# Patient Record
Sex: MALE | Race: WHITE | HISPANIC OR LATINO | Employment: FULL TIME | URBAN - METROPOLITAN AREA
[De-identification: names, ages, dates, MRNs, and addresses within clinical notes are randomized per-mention and may not be internally consistent; named-entity substitution may affect disease eponyms.]

---

## 2018-06-07 ENCOUNTER — OFFICE VISIT (OUTPATIENT)
Dept: FAMILY MEDICINE | Facility: CLINIC | Age: 43
End: 2018-06-07
Payer: COMMERCIAL

## 2018-06-07 VITALS
WEIGHT: 174.69 LBS | TEMPERATURE: 99 F | OXYGEN SATURATION: 99 % | HEART RATE: 71 BPM | BODY MASS INDEX: 25.01 KG/M2 | HEIGHT: 70 IN | DIASTOLIC BLOOD PRESSURE: 90 MMHG | SYSTOLIC BLOOD PRESSURE: 122 MMHG

## 2018-06-07 DIAGNOSIS — Z13.220 SCREENING FOR CHOLESTEROL LEVEL: ICD-10-CM

## 2018-06-07 DIAGNOSIS — F17.200 TOBACCO DEPENDENCE: ICD-10-CM

## 2018-06-07 DIAGNOSIS — M25.511 CHRONIC RIGHT SHOULDER PAIN: Primary | ICD-10-CM

## 2018-06-07 DIAGNOSIS — R03.0 ELEVATED BLOOD-PRESSURE READING WITHOUT DIAGNOSIS OF HYPERTENSION: ICD-10-CM

## 2018-06-07 DIAGNOSIS — G89.29 CHRONIC RIGHT SHOULDER PAIN: Primary | ICD-10-CM

## 2018-06-07 PROCEDURE — 3008F BODY MASS INDEX DOCD: CPT | Mod: CPTII,S$GLB,, | Performed by: FAMILY MEDICINE

## 2018-06-07 PROCEDURE — 99999 PR PBB SHADOW E&M-EST. PATIENT-LVL III: CPT | Mod: PBBFAC,,, | Performed by: FAMILY MEDICINE

## 2018-06-07 PROCEDURE — 99204 OFFICE O/P NEW MOD 45 MIN: CPT | Mod: S$GLB,,, | Performed by: FAMILY MEDICINE

## 2018-06-07 RX ORDER — IBUPROFEN 100 MG/1
TABLET, CHEWABLE ORAL
COMMUNITY
End: 2018-09-07 | Stop reason: ALTCHOICE

## 2018-06-07 NOTE — PROGRESS NOTES
Office Visit    Patient Name: Noe Macdonald    : 1975  MRN: [id number]      Assessment/Plan:  Noe Macdonald is a 42 y.o. male who presents today for :    Chronic right shoulder pain  -     Ambulatory referral to Orthopedics  -activity modification d/w patient: avoid reaching overhead activities, avoid heavy lifting and provocative movements, alternate ice/heat/deep massage. Refer to Ortho given chronicity  -shoulder ROM stretching and strengthening exercises demonstrated in clinic and handouts given to patient  -RTC in 4-6 weeks if not better, or sooner if pain worsens.  -patient advised to use OTC Tylenol (325mg tablets) once every 4-6 hours as needed for pain - avoid regular use of NSAIDs due to potential GI/BP side effects and elevated BP.      Screening for cholesterol level  -     Lipid panel; Future; Expected date: 2018  Elevated blood-pressure reading without diagnosis of hypertension  -     Hemoglobin A1c; Future; Expected date: 2018  -     CBC Without Differential; Future; Expected date: 2018  -     Comprehensive metabolic panel; Future; Expected date: 2018  -no known prior Dx of HTN  -BP log advised and patient was counseled on DASH diet and regular exercise, avoiding smoking and keeping alcohol consumption in moderation  -RTC in 2 weeks for BP f/u if not consistently <140/90    Tobacco dependence  -Recd patient to quit tobacco usage, and advised on several options to quit and the benefits of quitting, such as: decreasing the risk of cancer, HTN, CVD, among other diseases.  -Patient is in pre-contemplative state of change      Follow-up for worsening Sx or as needed.     This note was created by combination of typed  and Dragon dictation.  Transcription errors may be present.  If there are any questions, please contact  me.        ----------------------------------------------------------------------------------------------------------------------      HPI:  Noe is a 42 y.o. male who presents today for:    Shoulder Pain      This patient is new to me     This patient has multiple medical diagnoses as noted below.      Patient is here today for L Shoulder Pain  for the past year - has not seen a physician for this due to lack of insurance until now. Pt states he takes Advil as needed for pain, usually once a day, which is sufficient to relieve the pain temporarily. Denies recent injury/trauma to area. No slips/falls.  He works as an  - pain is worse with raising L arm above horizontal above the shoulder. Resting makes the pain better During the day the pain is 2/10, but at night, it can get up to 6/10. He would like to get referral to Ortho/Sports medicine for further eval. No prior PT.  No wt loss/fatigue/malaise  No tingling/numbness/weakness.  Denies pain radiation  He has no prior h/o HTN, but his BP is elevated today, which could be due to his chronic NSAID use. He is a 1/2 ppd smoker.        Additional ROS    No CP/SOB/palpitations/swelling  No cough/wheezing/SOB  No nausea/vomiting/abd pain  No rash, no history of allergies to any specific substances    Patient Active Problem List   Diagnosis    Suicide attempt by substance overdose    Adjustment disorder with depressed mood    Alcohol abuse, continuous       Current Medications  Medications reviewed/updated.     Current Outpatient Prescriptions on File Prior to Visit   Medication Sig Dispense Refill    [DISCONTINUED] hydrocodone-acetaminophen 5-325mg (NORCO) 5-325 mg per tablet Take 1 tablet by mouth every 6 (six) hours as needed for Pain.       No current facility-administered medications on file prior to visit.        Past Surgical History:   Procedure Laterality Date    KNEE SURGERY         Family History   Problem Relation Age of Onset    Cancer Father  "    Hypertension Mother        Social History     Social History    Marital status: Single     Spouse name: N/A    Number of children: N/A    Years of education: N/A     Occupational History    Not on file.     Social History Main Topics    Smoking status: Current Some Day Smoker    Smokeless tobacco: Not on file    Alcohol use Yes    Drug use: No    Sexual activity: Not on file     Other Topics Concern    Not on file     Social History Narrative    No narrative on file           Allergies   Review of patient's allergies indicates:  No Known Allergies          Review of Systems  See HPI      Physical Exam  BP (!) 122/90   Pulse 71   Temp 98.6 °F (37 °C) (Oral)   Ht 5' 10" (1.778 m)   Wt 79.2 kg (174 lb 11.4 oz)   SpO2 99%   BMI 25.07 kg/m²     GEN: NAD, well developed, pleasant, well nourished  HEENT: NCAT, PERRLA, EOMI, sclera clear, anicteric, O/P clear, MMM with no lesions  NECK: normal, supple with midline trachea, no LAD, no thyromegaly  LUNGS: CTAB, no w/r/r, no increased work of breathing   HEART: RRR, normal S1 and S2, no m/r/g, no edema  ABD: s/nt/nd, NABS  SKIN: normal turgor, no rashes  PSYCH: AOx3, appropriate mood and affect  MSK: warm/well perfused, normal ROM in all 4 extremities, no c/c/e.  R shoulder with FROM except for abduction past horizontal. Normal  Flexion/extention/adduction/empty can/push off/internal and external rotation.   No swelling/erythema/TTP.                 "

## 2018-06-15 ENCOUNTER — TELEPHONE (OUTPATIENT)
Dept: FAMILY MEDICINE | Facility: CLINIC | Age: 43
End: 2018-06-15

## 2018-06-15 NOTE — LETTER
Ivonne 15, 2018    Noe Macdonald  [address] [place] [address]             [facility]  [address]  [place] [address]  Phone: [phone]  Fax: [phone] Dear Mr. Macdonald:    Donald we were unable to contact you to schedule your Orthopedic appointment. Please give the referral department a call at [phone].      If you have any questions or concerns, please don't hesitate to call.    Sincerely,        Chelsea Menodza MA

## 2018-06-27 ENCOUNTER — TELEPHONE (OUTPATIENT)
Dept: FAMILY MEDICINE | Facility: CLINIC | Age: 43
End: 2018-06-27

## 2018-06-27 NOTE — TELEPHONE ENCOUNTER
----- Message from Meredith Tavarez sent at 6/13/2018 10:52 AM CDT -----  Contact: Self  Pt calling to schedule ortho appt. [phone].

## 2018-09-04 ENCOUNTER — OFFICE VISIT (OUTPATIENT)
Dept: URGENT CARE | Facility: CLINIC | Age: 43
End: 2018-09-04
Payer: COMMERCIAL

## 2018-09-04 VITALS — DIASTOLIC BLOOD PRESSURE: 88 MMHG | SYSTOLIC BLOOD PRESSURE: 139 MMHG | HEART RATE: 60 BPM | TEMPERATURE: 98 F

## 2018-09-04 DIAGNOSIS — S39.012A ACUTE MYOFASCIAL STRAIN OF LUMBAR REGION, INITIAL ENCOUNTER: Primary | ICD-10-CM

## 2018-09-04 DIAGNOSIS — M54.50 ACUTE MIDLINE LOW BACK PAIN WITHOUT SCIATICA: ICD-10-CM
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
## 2018-09-04 PROCEDURE — 99203 OFFICE O/P NEW LOW 30 MIN: CPT | Mod: S$GLB,,, | Performed by: PHYSICIAN ASSISTANT

## 2018-09-04 NOTE — PROGRESS NOTES
Subjective:       Patient ID: Noe Macdonald is a 42 y.o. male.    Chief Complaint: Back Pain    New work injury (9/4/2018) Pt reports pushing a pallet with heavy electrical equipment on it when his body went numb, and he had immediate low back pain. Pt c/o intermittent sharp low back pain in the center of his back. Pt denies previous back injury, or radiating pain. He took Advil with no relief. Current pain 7/10 on pain scale. ajd        Review of Systems   Constitution: Negative for chills, fever and malaise/fatigue.   HENT: Negative for hearing loss and nosebleeds.    Eyes: Negative for blurred vision and redness.   Cardiovascular: Negative for chest pain and syncope.   Respiratory: Negative for cough and shortness of breath.    Skin: Negative for itching and rash.   Musculoskeletal: Positive for back pain. Negative for joint pain, muscle cramps, muscle weakness and stiffness.   Gastrointestinal: Negative for abdominal pain and bowel incontinence.   Genitourinary: Negative for bladder incontinence, dysuria, hematuria and urgency.   Neurological: Negative for disturbances in coordination, numbness and paresthesias.   Psychiatric/Behavioral: The patient is not nervous/anxious.    All other systems reviewed and are negative.      Objective:      Physical Exam   Constitutional: He appears well-developed and well-nourished. He is active. No distress.   HENT:   Head: Normocephalic and atraumatic.   Right Ear: Hearing and external ear normal.   Left Ear: Hearing and external ear normal.   Nose: Nose normal. No nasal deformity. No epistaxis.   Mouth/Throat: Oropharynx is clear and moist and mucous membranes are normal.   Eyes: Conjunctivae and lids are normal. Right conjunctiva is not injected. Left conjunctiva is not injected.   Neck: Trachea normal and normal range of motion. No spinous process tenderness and no muscular tenderness present.   Cardiovascular: Intact distal pulses and normal pulses.   Pulses:        Dorsalis pedis pulses are 2+ on the right side, and 2+ on the left side.        Posterior tibial pulses are 2+ on the right side, and 2+ on the left side.   Pulmonary/Chest: Effort normal. No stridor. No respiratory distress.   Abdominal: Normal appearance.   Musculoskeletal:        Cervical back: Normal.        Thoracic back: Normal.        Lumbar back: He exhibits decreased range of motion and tenderness. He exhibits no deformity and normal pulse.        Back:    Neurological: He is alert. He has normal strength and normal reflexes. No sensory deficit. GCS eye subscore is 4. GCS verbal subscore is 5. GCS motor subscore is 6.   Reflex Scores:       Patellar reflexes are 2+ on the right side and 2+ on the left side.       Achilles reflexes are 2+ on the right side and 2+ on the left side.  SLR negative bilaterally.    Skin: Skin is warm, dry and intact. No abrasion and no bruising noted.   Psychiatric: He has a normal mood and affect. His speech is normal and behavior is normal. Thought content normal. Cognition and memory are normal. He is attentive.   Nursing note and vitals reviewed.        X-ray Lumbar Spine 2 Or 3 Views    Result Date: 9/4/2018  EXAMINATION: XR LUMBAR SPINE 2 OR 3 VIEWS CLINICAL HISTORY: acute LBP;  Low back pain TECHNIQUE: Lumbar spine 2 of 2 or three views COMPARISON: None FINDINGS: Bony structures are intact.  Disc spaces are maintained.  No collapse or destruction.     No acute findings Electronically signed by: Balwinder Puente MD Date:    09/04/2018 Time:    13:05  Assessment:       1. Acute myofascial strain of lumbar region, initial encounter    2. Acute midline low back pain without sciatica    3. Work related injury        Plan:       OTC ibuprofen and tylenol as needed for pain.      Patient Instructions: Attention not to aggravate affected area   Restrictions: Regular Duty, Home today(Attention not to aggravate low back. )  Follow-up in about 1 week (around 9/11/2018).         Patient Instructions       Understanding Lumbosacral Strain    Lumbosacral strain is a medical term for an injury that causes low back pain. The lumbosacral area (low back) is between the bottom of the ribcage and the top of the buttocks. A strain is tearing of muscles and tendons. These tears can be very small but still cause pain.  How a lumbosacral strain happens  Muscles and tendons connected to the spine can be strained in a number of ways:  · Sitting or standing in the same position for long periods of time. This can harm the low back over time. Poor posture can make low back pain more likely.  · Moving the muscles and tendons past their usual range of motion. This can cause a sudden injury. This can happen when you twist, bend over, or lift something heavy. Not using correct technique for sports or tasks like lifting can make back injury more likely.  · Accidents or falls  Lumbosacral strain can be caused by other problems, but these are less common.  Symptoms of lumbosacral strain  Symptoms may include:  · Pain in the back, often on one side  · Pain that gets worse with movement and gets better with rest  · Inability to move as freely as usual  · Swelling, slight redness, and skin warmth in the painful area  Treatment for lumbosacral strain  Low back pain often goes away by itself within several weeks. But it often comes back. Treatment focuses on reducing pain and avoiding further injury. Bed rest is usually not recommended for low back pain. Treatments may include:  · Avoiding or changing the action that caused the problem. This helps prevent injuring the tissues again.  · Prescription or over-the-counter pain medicines. These help reduce inflammation, swelling, and pain.  · Cold or heat packs. These help reduce pain and swelling.  · Stretching and other exercises. These improve flexibility and strength.  · Physical therapy. This usually includes exercises and other treatments.  · Injections of  medicine. This may relieve symptoms.  If these treatments do not relieve symptoms, your healthcare provider may order imaging tests to learn more about the problem. Sometimes you may need surgery.  Possible complications of lumbosacral strain  If the cause of the pain is not addressed, symptoms may return or get worse. Follow your healthcare providers instructions on lifestyle changes and treating your back.     When to call your healthcare provider  Call your healthcare provider right away if you have any of these:  · Fever of 100.4°F (38°C) or higher, or as directed  · Numbness, tingling, or weakness  · Problems with bowel or bladder control, or problems having sex  · Pain that does not go away, or gets worse  · New symptoms   Date Last Reviewed: 3/10/2016  © 1875-6538 [employer]. [address] [place] [address]. All rights reserved. This information is not intended as a substitute for professional medical care. Always follow your healthcare professional's instructions.        Lumbar Extension (Flexibility)    1. Lie face down on your stomach, forehead on the floor. You can lie on a mat or towel.  2. Bend your arms next to your body and lift your upper body up on your forearms. Your palms and forearms should be flat on the floor. Keep your stomach and hips on the floor.  3. Hold your upper body up with your forearms for 20 seconds. Slowly lower back down to the floor.  4. Repeat 2 times, or as instructed.  Date Last Reviewed: 3/10/2016  © 7593-8903 [employer]. [address] [place] [address]. All rights reserved. This information is not intended as a substitute for professional medical care. Always follow your healthcare professional's instructions.        Lumbar Flexion (Flexibility)    5. Lie on your back on the floor, with your knees bent and your feet flat on the floor.  6. Gently pull your knees up toward your chest. Put your hands under your thighs to help  pull your knees up.  7. Press your lower back down to the floor. Hold for 20 seconds.  8. Lower your legs back down to the floor and relax.  9. Repeat 2 times, or as instructed.  Date Last Reviewed: 3/10/2016  © 1841-3131 [employer]. [address] [place] [address]. All rights reserved. This information is not intended as a substitute for professional medical care. Always follow your healthcare professional's instructions.        Lumbar Rotation    10. Lie on your back on the floor, with your knees bent and your feet flat on the floor. Dont press your neck or lower back to the floor.  11. Lean both of your knees to one side. Turn your head in the opposite direction. Keep your shoulders flat on the floor. Be gentle and dont push through pain.  12. Hold for 20 seconds. Then slowly move your knees and head in the other direction.  13. Repeat 2 to 5 times, or as instructed.   Date Last Reviewed: 3/10/2016  © 8544-1044 [employer]. [address] [place] [address]. All rights reserved. This information is not intended as a substitute for professional medical care. Always follow your healthcare professional's instructions.        Lumbar Stretch (Flexibility)    14. Lie on your back on the floor, with your knees bent and your feet flat on the floor. Dont press your neck or lower back to the floor.  15. Pull one knee up toward your chest. Clasp your hands under your thigh to help pull.  16. Hold for 30 to 60 seconds. Lower your leg back down to the floor.  17. Repeat 2 times, or as instructed.  18. Switch legs and repeat.  Date Last Reviewed: 3/10/2016  © 4084-5078 [employer]. [address] [place] [address]. All rights reserved. This information is not intended as a substitute for professional medical care. Always follow your healthcare professional's instructions.        Back Exercises: Back Press    Do this exercise on your hands and knees. Keep  your knees under your hips and your hands under your shoulders. Keep your spine in a neutral position (not arched or sagging). Be sure to maintain your necks natural curve:  · Tighten your stomach and buttock muscles to press your back upward. Let your head drop slightly.  · Hold for 5 seconds. Return to starting position.  · Repeat 5 times.  Date Last Reviewed: 10/11/2015  © 7188-4300 [employer]. [address] [place] [address], [place] [address]. All rights reserved. This information is not intended as a substitute for professional medical care. Always follow your healthcare professional's instructions.

## 2018-09-04 NOTE — LETTER
[facility]  [address] [place], [address]  [facility] [address]  Phone: [phone]  Fax: [phone]    Pt Name: Noe Macdonald  Injury Date: 09/04/2018   Employee ID: [id number] Date of First Treatment: 09/04/2018   Company: [employer]            Appointment Time: Arrived: 11:15 AM CDT   Physician: Balwinder Moseley PA-C Time out: 1:12 PM       Office Treatment: Noe was seen today for back pain.    Diagnoses and all orders for this visit:    Acute myofascial strain of lumbar region, initial encounter    Acute midline low back pain without sciatica  -     X-Ray Lumbar Spine 2 Or 3 Views    Work related injury       Patient Instructions: Attention not to aggravate affected area      Restrictions: Home today    Regular Duty  (Attention not to aggravate low back. )       Return Appointment: 9/11/2018 at 10:00 AM

## 2018-09-04 NOTE — PATIENT INSTRUCTIONS
Understanding Lumbosacral Strain    Lumbosacral strain is a medical term for an injury that causes low back pain. The lumbosacral area (low back) is between the bottom of the ribcage and the top of the buttocks. A strain is tearing of muscles and tendons. These tears can be very small but still cause pain.  How a lumbosacral strain happens  Muscles and tendons connected to the spine can be strained in a number of ways:  · Sitting or standing in the same position for long periods of time. This can harm the low back over time. Poor posture can make low back pain more likely.  · Moving the muscles and tendons past their usual range of motion. This can cause a sudden injury. This can happen when you twist, bend over, or lift something heavy. Not using correct technique for sports or tasks like lifting can make back injury more likely.  · Accidents or falls  Lumbosacral strain can be caused by other problems, but these are less common.  Symptoms of lumbosacral strain  Symptoms may include:  · Pain in the back, often on one side  · Pain that gets worse with movement and gets better with rest  · Inability to move as freely as usual  · Swelling, slight redness, and skin warmth in the painful area  Treatment for lumbosacral strain  Low back pain often goes away by itself within several weeks. But it often comes back. Treatment focuses on reducing pain and avoiding further injury. Bed rest is usually not recommended for low back pain. Treatments may include:  · Avoiding or changing the action that caused the problem. This helps prevent injuring the tissues again.  · Prescription or over-the-counter pain medicines. These help reduce inflammation, swelling, and pain.  · Cold or heat packs. These help reduce pain and swelling.  · Stretching and other exercises. These improve flexibility and strength.  · Physical therapy. This usually includes exercises and other treatments.  · Injections of medicine. This may relieve  symptoms.  If these treatments do not relieve symptoms, your healthcare provider may order imaging tests to learn more about the problem. Sometimes you may need surgery.  Possible complications of lumbosacral strain  If the cause of the pain is not addressed, symptoms may return or get worse. Follow your healthcare providers instructions on lifestyle changes and treating your back.     When to call your healthcare provider  Call your healthcare provider right away if you have any of these:  · Fever of 100.4°F (38°C) or higher, or as directed  · Numbness, tingling, or weakness  · Problems with bowel or bladder control, or problems having sex  · Pain that does not go away, or gets worse  · New symptoms   Date Last Reviewed: 3/10/2016  © 3601-9321 [employer]. [address] [place] [address]. All rights reserved. This information is not intended as a substitute for professional medical care. Always follow your healthcare professional's instructions.        Lumbar Extension (Flexibility)    1. Lie face down on your stomach, forehead on the floor. You can lie on a mat or towel.  2. Bend your arms next to your body and lift your upper body up on your forearms. Your palms and forearms should be flat on the floor. Keep your stomach and hips on the floor.  3. Hold your upper body up with your forearms for 20 seconds. Slowly lower back down to the floor.  4. Repeat 2 times, or as instructed.  Date Last Reviewed: 3/10/2016  © 6571-2188 [employer]. [address] [place] [address]. All rights reserved. This information is not intended as a substitute for professional medical care. Always follow your healthcare professional's instructions.        Lumbar Flexion (Flexibility)    5. Lie on your back on the floor, with your knees bent and your feet flat on the floor.  6. Gently pull your knees up toward your chest. Put your hands under your thighs to help pull your knees  up.  7. Press your lower back down to the floor. Hold for 20 seconds.  8. Lower your legs back down to the floor and relax.  9. Repeat 2 times, or as instructed.  Date Last Reviewed: 3/10/2016  © 0851-8967 [employer]. [address] [place] [address]. All rights reserved. This information is not intended as a substitute for professional medical care. Always follow your healthcare professional's instructions.        Lumbar Rotation    10. Lie on your back on the floor, with your knees bent and your feet flat on the floor. Dont press your neck or lower back to the floor.  11. Lean both of your knees to one side. Turn your head in the opposite direction. Keep your shoulders flat on the floor. Be gentle and dont push through pain.  12. Hold for 20 seconds. Then slowly move your knees and head in the other direction.  13. Repeat 2 to 5 times, or as instructed.   Date Last Reviewed: 3/10/2016  © 4613-0483 [employer]. [address] [place] [address]. All rights reserved. This information is not intended as a substitute for professional medical care. Always follow your healthcare professional's instructions.        Lumbar Stretch (Flexibility)    14. Lie on your back on the floor, with your knees bent and your feet flat on the floor. Dont press your neck or lower back to the floor.  15. Pull one knee up toward your chest. Clasp your hands under your thigh to help pull.  16. Hold for 30 to 60 seconds. Lower your leg back down to the floor.  17. Repeat 2 times, or as instructed.  18. Switch legs and repeat.  Date Last Reviewed: 3/10/2016  © 8694-6895 [employer]. [address] [place] [address]. All rights reserved. This information is not intended as a substitute for professional medical care. Always follow your healthcare professional's instructions.        Back Exercises: Back Press    Do this exercise on your hands and knees. Keep your knees under  your hips and your hands under your shoulders. Keep your spine in a neutral position (not arched or sagging). Be sure to maintain your necks natural curve:  · Tighten your stomach and buttock muscles to press your back upward. Let your head drop slightly.  · Hold for 5 seconds. Return to starting position.  · Repeat 5 times.  Date Last Reviewed: 10/11/2015  © 2133-4387 [employer]. [address] [place] [address], [place] [address]. All rights reserved. This information is not intended as a substitute for professional medical care. Always follow your healthcare professional's instructions.

## 2018-09-07 ENCOUNTER — OFFICE VISIT (OUTPATIENT)
Dept: OCCUPATIONAL MEDICINE | Facility: CLINIC | Age: 43
End: 2018-09-07
Payer: COMMERCIAL

## 2018-09-07 VITALS — SYSTOLIC BLOOD PRESSURE: 131 MMHG | DIASTOLIC BLOOD PRESSURE: 92 MMHG | HEART RATE: 64 BPM

## 2018-09-07 DIAGNOSIS — S39.012D ACUTE MYOFASCIAL STRAIN OF LUMBAR REGION, SUBSEQUENT ENCOUNTER: Primary | ICD-10-CM

## 2018-09-07 PROCEDURE — 99214 OFFICE O/P EST MOD 30 MIN: CPT | Mod: S$GLB,,, | Performed by: PHYSICIAN ASSISTANT

## 2018-09-07 RX ORDER — IBUPROFEN 400 MG/1
400 TABLET ORAL 3 TIMES DAILY
Qty: 30 TABLET | Refills: 0 | Status: SHIPPED | OUTPATIENT
Start: 2018-09-07 | End: 2020-12-22

## 2018-09-07 NOTE — LETTER
[facility] [address] [place] [address]  Phone: [phone]  Fax: [phone]    Pt Name: Noe Macdonald  Injury Date: 09/04/2018   Employee ID:  Date of First Treatment: 09/07/2018   Company: [employer]            Appointment Time: 02:00 PM Arrived:  2:15 PM CDT   Appointment Date: [unfilled] Time Out:03:15   Physician: Balwinder Moseley PA-C        Office Treatment: Noe was seen today for work related injury and back pain.    Diagnoses and all orders for this visit:    Acute myofascial strain of lumbar region, subsequent encounter  -     ibuprofen (ADVIL,MOTRIN) 400 MG tablet; Take 1 tablet (400 mg total) by mouth 3 (three) times daily. May take 1-2 tablets po TID. Take with food.       Patient Instructions: Attention not to aggravate affected area, Daily home exercises/warm soaks(May take tylenol 1000mg every 6 hours as needed. Benadryl 50mg at bedtime. )    Restrictions: (Duty not to aggravate back. )       Return Appointment: 9/11/2018

## 2018-09-07 NOTE — PROGRESS NOTES
Subjective:       Patient ID: Noe Macdonald is a 42 y.o. male.    Chief Complaint: Work Related Injury and Back Pain    Pt states that he is having lower back pain from his work related accident . Pain got real bad last night and requested to be seen today. He denies saddle paresthesias, bowel or bladder incontinence. He states it's hard to sleep and find a position of comfort. MB      Back Pain   This is a new problem. The current episode started in the past 7 days. The problem occurs constantly. The problem has been gradually worsening since onset. The pain is present in the lumbar spine. The quality of the pain is described as shooting. The pain is at a severity of 6/10. The pain is moderate. The symptoms are aggravated by bending, position, twisting, standing and sitting. Pertinent negatives include no abdominal pain, bladder incontinence, bowel incontinence, chest pain, fever, numbness or paresthesias. He has tried ice, heat and NSAIDs for the symptoms. The treatment provided mild relief.     Review of Systems   Constitution: Negative for chills and fever.   HENT: Negative for hearing loss and nosebleeds.    Eyes: Negative for blurred vision and redness.   Cardiovascular: Negative for chest pain and syncope.   Respiratory: Negative for cough and shortness of breath.    Skin: Negative for itching and rash.   Musculoskeletal: Positive for back pain. Negative for joint pain.   Gastrointestinal: Negative for abdominal pain and bowel incontinence.   Genitourinary: Negative for bladder incontinence.   Neurological: Negative for numbness and paresthesias.   Psychiatric/Behavioral: The patient is not nervous/anxious.        Objective:      Physical Exam   Constitutional: He appears well-developed and well-nourished. He is active. No distress.   HENT:   Head: Normocephalic and atraumatic.   Right Ear: Hearing and external ear normal.   Left Ear: Hearing and external ear normal.   Nose: Nose normal. No nasal  deformity. No epistaxis.   Mouth/Throat: Oropharynx is clear and moist and mucous membranes are normal.   Eyes: Conjunctivae and lids are normal. Right conjunctiva is not injected. Left conjunctiva is not injected.   Neck: Trachea normal and normal range of motion. No spinous process tenderness and no muscular tenderness present.   Cardiovascular: Intact distal pulses and normal pulses.   Pulses:       Dorsalis pedis pulses are 2+ on the right side, and 2+ on the left side.        Posterior tibial pulses are 2+ on the right side, and 2+ on the left side.   Pulmonary/Chest: Effort normal. No stridor. No respiratory distress.   Abdominal: Normal appearance.   Musculoskeletal:        Cervical back: Normal.        Thoracic back: Normal.        Lumbar back: He exhibits decreased range of motion and tenderness. He exhibits no deformity and normal pulse.        Back:    Neurological: He is alert. He has normal strength and normal reflexes. No sensory deficit. GCS eye subscore is 4. GCS verbal subscore is 5. GCS motor subscore is 6.   Reflex Scores:       Patellar reflexes are 2+ on the right side and 2+ on the left side.       Achilles reflexes are 2+ on the right side and 2+ on the left side.  SLR negative bilaterally.    Skin: Skin is warm, dry and intact. No abrasion and no bruising noted.   Psychiatric: He has a normal mood and affect. His speech is normal and behavior is normal. Thought content normal. Cognition and memory are normal. He is attentive.   Nursing note and vitals reviewed.      Assessment:       1. Acute myofascial strain of lumbar region, subsequent encounter        Plan:         Medications Ordered This Encounter   Medications    ibuprofen (ADVIL,MOTRIN) 400 MG tablet     Sig: Take 1 tablet (400 mg total) by mouth 3 (three) times daily. May take 1-2 tablets po TID. Take with food.     Dispense:  30 tablet     Refill:  0     Patient Instructions: Attention not to aggravate affected area, Daily home  exercises/warm soaks(May take tylenol 1000mg every 6 hours as needed. Benadryl 50mg at bedtime. )   Restrictions: (Duty not to aggravate back. )  Follow-up in about 4 days (around 9/11/2018).

## 2018-09-11 ENCOUNTER — OFFICE VISIT (OUTPATIENT)
Dept: URGENT CARE | Facility: CLINIC | Age: 43
End: 2018-09-11
Payer: COMMERCIAL

## 2018-09-11 DIAGNOSIS — S39.012D ACUTE MYOFASCIAL STRAIN OF LUMBAR REGION, SUBSEQUENT ENCOUNTER: Primary | ICD-10-CM
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
## 2018-09-11 PROCEDURE — 99214 OFFICE O/P EST MOD 30 MIN: CPT | Mod: S$GLB,,, | Performed by: PHYSICIAN ASSISTANT

## 2018-09-11 NOTE — PROGRESS NOTES
"Subjective:       Patient ID: Noe Macdonald is a 42 y.o. male.    Chief Complaint: Back Injury    F/u for low back injury (9/4/2018) Pt states his back is "still a little sore" "doing better than Friday" He states his back injury is more of a discomfort than pain. Prolonged sitting and standing affects his injury. He denies any numbness in his lower extremities. Pt has not taken meds in the past two days for pain. Last dose of ibuprofen 800 was Sunday which provided mild relief.       Review of Systems   Constitution: Negative for chills, fever and malaise/fatigue.   HENT: Negative for hearing loss and nosebleeds.    Eyes: Negative for blurred vision and redness.   Cardiovascular: Negative for chest pain and syncope.   Respiratory: Negative for cough and shortness of breath.    Skin: Negative for itching and rash.   Musculoskeletal: Positive for back pain and myalgias. Negative for joint pain, muscle cramps, muscle weakness and stiffness.   Gastrointestinal: Negative for abdominal pain and bowel incontinence.   Genitourinary: Negative for bladder incontinence, dysuria, hematuria and urgency.   Neurological: Negative for disturbances in coordination, numbness and paresthesias.   Psychiatric/Behavioral: The patient is not nervous/anxious.    All other systems reviewed and are negative.      Objective:      Physical Exam   Constitutional: He appears well-developed and well-nourished. He is active. No distress.   HENT:   Head: Normocephalic and atraumatic.   Right Ear: Hearing and external ear normal.   Left Ear: Hearing and external ear normal.   Nose: Nose normal. No nasal deformity. No epistaxis.   Mouth/Throat: Oropharynx is clear and moist and mucous membranes are normal.   Eyes: Conjunctivae and lids are normal. Right conjunctiva is not injected. Left conjunctiva is not injected.   Neck: Trachea normal and normal range of motion. No spinous process tenderness and no muscular tenderness present. "   Cardiovascular: Intact distal pulses and normal pulses.   Pulses:       Dorsalis pedis pulses are 2+ on the right side, and 2+ on the left side.        Posterior tibial pulses are 2+ on the right side, and 2+ on the left side.   Pulmonary/Chest: Effort normal. No stridor. No respiratory distress.   Abdominal: Normal appearance.   Musculoskeletal:        Cervical back: Normal.        Thoracic back: Normal.        Lumbar back: He exhibits tenderness. He exhibits normal range of motion, no deformity and normal pulse.        Back:    Neurological: He is alert. He has normal strength and normal reflexes. No sensory deficit. GCS eye subscore is 4. GCS verbal subscore is 5. GCS motor subscore is 6.   Reflex Scores:       Patellar reflexes are 2+ on the right side and 2+ on the left side.       Achilles reflexes are 2+ on the right side and 2+ on the left side.  SLR negative bilaterally.    Skin: Skin is warm, dry and intact. No abrasion and no bruising noted.   Psychiatric: He has a normal mood and affect. His speech is normal and behavior is normal. Thought content normal. Cognition and memory are normal. He is attentive.   Nursing note and vitals reviewed.      Assessment:       1. Acute myofascial strain of lumbar region, subsequent encounter    2. Work related injury        Plan:            Patient Instructions: Daily home exercises/warm soaks(Ibuprofen as needed for pain. )   Restrictions: Regular Duty(Duty not to aggravate back. )  No Follow-up on file.

## 2018-09-11 NOTE — LETTER
[facility]  [address], [address]  [facility] [address]  Phone: [phone]  Fax: [phone]    Pt Name: Noe Macdonald  Injury Date: 09/04/2018   Employee ID: [id number] Date 09/11/2018   Company: [employer]            Appointment Time: 10:00 AM Arrived: 10:00 AM CDT   Physician: Balwinder Moseley PA-C Time out: 11:00 AM       Office Treatment: Noe was seen today for back injury.    Diagnoses and all orders for this visit:    Acute myofascial strain of lumbar region, subsequent encounter    Work related injury       Patient Instructions: Daily home exercises/warm soaks(Ibuprofen as needed for pain. )      Restrictions: Regular Duty   (Duty not to aggravate back.)       Return Appointment: 9/18/2018 @ 10:30 AM

## 2019-08-27 ENCOUNTER — OFFICE VISIT (OUTPATIENT)
Dept: URGENT CARE | Facility: CLINIC | Age: 44
End: 2019-08-27
Payer: COMMERCIAL

## 2019-08-27 VITALS
BODY MASS INDEX: 24.36 KG/M2 | SYSTOLIC BLOOD PRESSURE: 132 MMHG | OXYGEN SATURATION: 99 % | HEIGHT: 71 IN | DIASTOLIC BLOOD PRESSURE: 88 MMHG | WEIGHT: 174 LBS | HEART RATE: 70 BPM | TEMPERATURE: 99 F

## 2019-08-27 DIAGNOSIS — L02.415 ABSCESS OF RIGHT THIGH: Primary | ICD-10-CM

## 2019-08-27 DIAGNOSIS — B35.6 TINEA CRURIS: ICD-10-CM

## 2019-08-27 PROCEDURE — 99214 OFFICE O/P EST MOD 30 MIN: CPT | Mod: S$GLB,,, | Performed by: PHYSICIAN ASSISTANT

## 2019-08-27 PROCEDURE — 3008F PR BODY MASS INDEX (BMI) DOCUMENTED: ICD-10-PCS | Mod: CPTII,S$GLB,, | Performed by: PHYSICIAN ASSISTANT

## 2019-08-27 PROCEDURE — 3008F BODY MASS INDEX DOCD: CPT | Mod: CPTII,S$GLB,, | Performed by: PHYSICIAN ASSISTANT

## 2019-08-27 PROCEDURE — 99214 PR OFFICE/OUTPT VISIT, EST, LEVL IV, 30-39 MIN: ICD-10-PCS | Mod: S$GLB,,, | Performed by: PHYSICIAN ASSISTANT

## 2019-08-27 RX ORDER — KETOCONAZOLE 20 MG/G
CREAM TOPICAL DAILY
Qty: 15 G | Refills: 0 | Status: SHIPPED | OUTPATIENT
Start: 2019-08-27 | End: 2020-12-22

## 2019-08-27 RX ORDER — SULFAMETHOXAZOLE AND TRIMETHOPRIM 800; 160 MG/1; MG/1
1 TABLET ORAL 2 TIMES DAILY
Qty: 10 TABLET | Refills: 0 | Status: SHIPPED | OUTPATIENT
Start: 2019-08-27 | End: 2019-09-01

## 2019-08-27 RX ORDER — MUPIROCIN 20 MG/G
OINTMENT TOPICAL
Qty: 22 G | Refills: 1 | Status: SHIPPED | OUTPATIENT
Start: 2019-08-27 | End: 2020-12-22

## 2019-08-27 NOTE — PROGRESS NOTES
"Subjective:       Patient ID: Noe Macdonald is a 43 y.o. male.    Vitals:  height is 5' 11" (1.803 m) and weight is 78.9 kg (174 lb). His temperature is 99.4 °F (37.4 °C). His blood pressure is 132/88 and his pulse is 70. His oxygen saturation is 99%.     Chief Complaint: Rash and Abscess    Pt reports that he has been having a rash on his groin for four weeks that is red and itchy. He has tried otc ointments with no relief. He also states having a possible abcesss on his right upper thigh that he noticed yesterday. Area is very tender and red. Denies drainage, fever, n/v.     Rash   This is a chronic problem. The current episode started 1 to 4 weeks ago. The problem has been gradually worsening since onset. The affected locations include the right upper leg and left upper leg. The rash is characterized by itchiness, redness and swelling. It is unknown if there was an exposure to a precipitant. Pertinent negatives include no congestion, cough, facial edema, fatigue, fever, rhinorrhea or sore throat. Past treatments include anti-itch cream. The treatment provided no relief.   Abscess   Chronicity:  NewProgression Since Onset: rapidly worsening  Location:  Leg  Associated Symptoms: no fever, no chills  Characteristics: painful    Treatments Tried:  Nothing  Relieved by:  Nothing  Worsened by:  Nothing      Constitution: Negative for chills, fatigue and fever.   HENT: Negative for facial swelling, congestion and sore throat.    Neck: Negative for painful lymph nodes.   Eyes: Negative for eye itching and eyelid swelling.   Respiratory: Negative for cough.    Musculoskeletal: Negative for joint pain and joint swelling.   Skin: Positive for rash and abscess. Negative for color change, pale, wound, abrasion, laceration, lesion, skin thickening/induration, puncture wound, erythema, avulsion and hives.   Allergic/Immunologic: Negative for environmental allergies, immunocompromised state and hives. "   Hematologic/Lymphatic: Negative for swollen lymph nodes.       Objective:      Physical Exam   Constitutional: He is oriented to person, place, and time. He appears well-developed and well-nourished.   HENT:   Head: Normocephalic and atraumatic. Head is without abrasion, without contusion and without laceration.   Right Ear: External ear normal.   Left Ear: External ear normal.   Nose: Nose normal.   Mouth/Throat: Oropharynx is clear and moist.   Eyes: Pupils are equal, round, and reactive to light. Conjunctivae, EOM and lids are normal.   Neck: Trachea normal, full passive range of motion without pain and phonation normal. Neck supple.   Cardiovascular: Normal rate, regular rhythm and normal heart sounds.   Pulmonary/Chest: Effort normal and breath sounds normal. No stridor. No respiratory distress.   Musculoskeletal: Normal range of motion.   Neurological: He is alert and oriented to person, place, and time.   Skin: Skin is warm, dry and intact. Capillary refill takes less than 2 seconds. Rash noted. No abrasion, no bruising, no burn, no ecchymosis, no laceration and no lesion noted. No erythema.        Dry, scaly, erythematous rash to bilateral groin.    Psychiatric: He has a normal mood and affect. His speech is normal and behavior is normal. Judgment and thought content normal. Cognition and memory are normal.   Nursing note and vitals reviewed.      Assessment:       1. Abscess of right thigh    2. Tinea cruris        Plan:         Abscess of right thigh  -     sulfamethoxazole-trimethoprim 800-160mg (BACTRIM DS) 800-160 mg Tab; Take 1 tablet by mouth 2 (two) times daily. for 5 days  Dispense: 10 tablet; Refill: 0  -     mupirocin (BACTROBAN) 2 % ointment; Apply to affected area 3 times daily  Dispense: 22 g; Refill: 1    Tinea cruris  -     ketoconazole (NIZORAL) 2 % cream; Apply topically once daily.  Dispense: 15 g; Refill: 0      Abscess (Antibiotic Treatment Only)  An abscess (sometimes called a boil)  happens when bacteria get trapped under the skin and start to grow. Pus forms inside the abscess as the body responds to the bacteria. An abscess can happen with an insect bite, ingrown hair, blocked oil gland, pimple, cyst, or puncture wound.  In the early stages, your wound may be red and tender. For this stage, you may get antibiotics. If the abscess does not get better with antibiotics, it will need to be drained with a small cut.  Home care  These tips will help you care for your abscess at home:  · Soak the wound in hot water or apply hot packs (small towel soaked in hot water) to the area for 20 minutes at a time. Do this 3 to 4 times a day.  · Do not cut, squeeze, or pop the boil yourself.  · Apply antibiotic cream or ointment to the skin 3 to 4 times a day, unless something else was prescribed. Some ointments include an antibiotic plus a pain reliever.  · If your doctor prescribed antibiotics, do not stop taking them until you have finished the medicine or the doctor tells you to stop.  · You may use an over-the-counter pain medicine to control pain, unless another pain medicine was prescribed. If you have chronic liver or kidney disease or ever had a stomach ulcer or gastrointestinal bleeding, talk with your doctor before using these any of these.  Follow-up care  Follow up with your healthcare provider, or as advised. Check your wound each day for the signs of worsening infection listed below.  When to seek medical advice  Get prompt medical attention if any of these occur:  · An increase in redness or swelling  · Red streaks in the skin leading away from the abscess  · An increase in local pain or swelling  · Fever of 100.4ºF (38ºC) or higher, or as directed by your healthcare provider  · Pus or fluid coming from the abscess  · Boil returns after getting better  Date Last Reviewed: 9/1/2016  © 8052-9280 [employer]. [address] [place] [address], [place] [address]. All rights reserved. This  information is not intended as a substitute for professional medical care. Always follow your healthcare professional's instructions.        Jock Itch  Jock itch is a rash caused by a fungal infection. It occurs in the skin fold between the thigh and groin where it is warm and moist.  It starts as a small, red, itchy patch that grows larger in the shape of a round ring, 1- to 2- inches wide, and may cause the skin to flake. It may also spread to the scrotum or the skin that covers your testicles. This infection is treated with skin creams or oral medicine.  Home care  The following will help you care for yourself at home:  · If you were prescribed a cream, it should be applied exactly as directed. Some antifungal creams are available without a prescription.  · It may take a week before the fungus starts to go away and it can take about 2 to 3 weeks to completely clear. To prevent recurrence, it is important to keep using the medicine until the rash is all gone.  · Wash the groin area at least once a day with soap and water. Pat dry and apply the medicine. Change to freshly laundered underwear daily.  · Once the rash is gone, keep the area clean and dry to keep it from coming back. If recurrence is a problem, use a medicated antifungal powder daily in the groin area.  Prevention  The following tips may help prevent jock itch:  · Don't share clothes, towels, or sports gear with others unless they have been washed.  · Change underwear daily.  · Keep skin clean and dry, especially after showering or swimming.  · Lose weight.  · Do not wear tight underwear.  · Treat athletes foot if it occurs.  Follow-up care  Follow up with your healthcare provider as advised by our staff if the rash is not starting to get better after 10 days of treatment or if the rash continues to spread.  When to seek medical care  Get prompt medical attention if any of the following occur:  · Fluid draining from the rash  · Increasing redness  around the rash  · Rash returns soon after treatment  Date Last Reviewed: 8/1/2016  © 6976-9763 [employer]. [address] [place] [address], [place] [address]. All rights reserved. This information is not intended as a substitute for professional medical care. Always follow your healthcare professional's instructions.

## 2019-08-27 NOTE — PATIENT INSTRUCTIONS
Abscess (Antibiotic Treatment Only)  An abscess (sometimes called a boil) happens when bacteria get trapped under the skin and start to grow. Pus forms inside the abscess as the body responds to the bacteria. An abscess can happen with an insect bite, ingrown hair, blocked oil gland, pimple, cyst, or puncture wound.  In the early stages, your wound may be red and tender. For this stage, you may get antibiotics. If the abscess does not get better with antibiotics, it will need to be drained with a small cut.  Home care  These tips will help you care for your abscess at home:  · Soak the wound in hot water or apply hot packs (small towel soaked in hot water) to the area for 20 minutes at a time. Do this 3 to 4 times a day.  · Do not cut, squeeze, or pop the boil yourself.  · Apply antibiotic cream or ointment to the skin 3 to 4 times a day, unless something else was prescribed. Some ointments include an antibiotic plus a pain reliever.  · If your doctor prescribed antibiotics, do not stop taking them until you have finished the medicine or the doctor tells you to stop.  · You may use an over-the-counter pain medicine to control pain, unless another pain medicine was prescribed. If you have chronic liver or kidney disease or ever had a stomach ulcer or gastrointestinal bleeding, talk with your doctor before using these any of these.  Follow-up care  Follow up with your healthcare provider, or as advised. Check your wound each day for the signs of worsening infection listed below.  When to seek medical advice  Get prompt medical attention if any of these occur:  · An increase in redness or swelling  · Red streaks in the skin leading away from the abscess  · An increase in local pain or swelling  · Fever of 100.4ºF (38ºC) or higher, or as directed by your healthcare provider  · Pus or fluid coming from the abscess  · Boil returns after getting better  Date Last Reviewed: 9/1/2016  © 2733-7743 [employer] [address] [place] [address]. All rights reserved. This information is not intended as a substitute for professional medical care. Always follow your healthcare professional's instructions.        Jock Itch  Jock itch is a rash caused by a fungal infection. It occurs in the skin fold between the thigh and groin where it is warm and moist.  It starts as a small, red, itchy patch that grows larger in the shape of a round ring, 1- to 2- inches wide, and may cause the skin to flake. It may also spread to the scrotum or the skin that covers your testicles. This infection is treated with skin creams or oral medicine.  Home care  The following will help you care for yourself at home:  · If you were prescribed a cream, it should be applied exactly as directed. Some antifungal creams are available without a prescription.  · It may take a week before the fungus starts to go away and it can take about 2 to 3 weeks to completely clear. To prevent recurrence, it is important to keep using the medicine until the rash is all gone.  · Wash the groin area at least once a day with soap and water. Pat dry and apply the medicine. Change to freshly laundered underwear daily.  · Once the rash is gone, keep the area clean and dry to keep it from coming back. If recurrence is a problem, use a medicated antifungal powder daily in the groin area.  Prevention  The following tips may help prevent jock itch:  · Don't share clothes, towels, or sports gear with others unless they have been washed.  · Change underwear daily.  · Keep skin clean and dry, especially after showering or swimming.  · Lose weight.  · Do not wear tight underwear.  · Treat athletes foot if it occurs.  Follow-up care  Follow up with your healthcare provider as advised by our staff if the rash is not starting to get better after 10 days of treatment or if the rash continues to spread.  When to seek medical care  Get prompt medical attention if any of the  following occur:  · Fluid draining from the rash  · Increasing redness around the rash  · Rash returns soon after treatment  Date Last Reviewed: 8/1/2016  © 2671-5754 [employer]. [address] [place] [address], [place] [address]. All rights reserved. This information is not intended as a substitute for professional medical care. Always follow your healthcare professional's instructions.

## 2020-12-22 ENCOUNTER — OFFICE VISIT (OUTPATIENT)
Dept: URGENT CARE | Facility: CLINIC | Age: 45
End: 2020-12-22
Payer: COMMERCIAL

## 2020-12-22 VITALS
OXYGEN SATURATION: 97 % | SYSTOLIC BLOOD PRESSURE: 152 MMHG | HEART RATE: 63 BPM | TEMPERATURE: 99 F | DIASTOLIC BLOOD PRESSURE: 93 MMHG

## 2020-12-22 DIAGNOSIS — R09.1 PLEURISY: ICD-10-CM

## 2020-12-22 DIAGNOSIS — M54.9 UPPER BACK PAIN ON LEFT SIDE: Primary | ICD-10-CM

## 2020-12-22 PROCEDURE — 99214 OFFICE O/P EST MOD 30 MIN: CPT | Mod: S$GLB,,, | Performed by: PHYSICIAN ASSISTANT

## 2020-12-22 PROCEDURE — 99214 PR OFFICE/OUTPT VISIT, EST, LEVL IV, 30-39 MIN: ICD-10-PCS | Mod: S$GLB,,, | Performed by: PHYSICIAN ASSISTANT

## 2020-12-22 PROCEDURE — 71046 XR CHEST PA AND LATERAL: ICD-10-PCS | Mod: S$GLB,,, | Performed by: RADIOLOGY

## 2020-12-22 PROCEDURE — 71046 X-RAY EXAM CHEST 2 VIEWS: CPT | Mod: S$GLB,,, | Performed by: RADIOLOGY

## 2020-12-22 RX ORDER — IBUPROFEN 800 MG/1
800 TABLET ORAL 3 TIMES DAILY
Qty: 30 TABLET | Refills: 0 | Status: SHIPPED | OUTPATIENT
Start: 2020-12-22 | End: 2021-01-01

## 2020-12-22 RX ORDER — METHOCARBAMOL 500 MG/1
500 TABLET, FILM COATED ORAL 4 TIMES DAILY
Qty: 40 TABLET | Refills: 0 | Status: SHIPPED | OUTPATIENT
Start: 2020-12-22 | End: 2021-01-01

## 2020-12-22 NOTE — PATIENT INSTRUCTIONS
General Discharge Instructions   If you were prescribed a narcotic or controlled medication, do not drive or operate heavy equipment or machinery while taking these medications.  If you were prescribed antibiotics, please take them to completion.  You must understand that you've received an Urgent Care treatment only and that you may be released before all your medical problems are known or treated. You, the patient, will arrange for follow up care as instructed.  Follow up with your PCP or specialty clinic as directed in the next 1-2 weeks if not improved or as needed.  You can call [phone] to schedule an appointment with the appropriate provider.  If your condition worsens we recommend that you receive another evaluation at the emergency room immediately or contact your primary medical clinics after hours call service to discuss your concerns.  Please return here or go to the Emergency Department for any concerns or worsening of condition.      Pleurisy    If you have pleurisy, the lining around your lungs is inflamed. This is most often due to a viral infection or pneumonia. It usually lasts for 10 to 14 days. It may cause sharp pain with breathing, coughing, sneezing, and movement. Antibiotics are usually not prescribed for this condition unless pneumonia is also present.  The following tips will help you care for your condition at home:  · If symptoms are severe, rest at home for the first 2 to 3 days. When you resume activity, don't let yourself get too tired.  · Do not smoke. Also avoid being exposed to secondhand smoke.  · You may use over-the-counter medicines to control pain, unless another pain medicine was prescribed. (Note: If you have chronic liver or kidney disease or have ever had a stomach ulcer or gastrointestinal bleeding, talk with your healthcare provider before using these medicines. Also talk to your provider if you are taking medicine to prevent blood clots.) Aspirin should never be  given to anyone younger than 18 years of age who is ill with a viral infection or fever. It may cause severe liver or brain damage.  Follow-up care  Follow up with your healthcare provider, or as advised.  When to seek medical advice  Call your healthcare provider right away if any of these occur:  · Fever over 100.4ºF (38ºC)  · Coughing up lots of colored sputum (mucus)  · Redness, pain, or swelling of the leg  Call 911, or get immediate medical care  Contact emergency services right away if any of these occur:  · Increasing shortness of breath  · Increasing chest pain, or pain that spreads to the neck, arm, or back  · Coughing up blood  Date Last Reviewed: 9/13/2015 © 2000-2017 [employer]. [address] [place] [address], [place] [address]. All rights reserved. This information is not intended as a substitute for professional medical care. Always follow your healthcare professional's instructions.      Back Pain (Acute or Chronic)    Back pain is one of the most common problems. The good news is that most people feel better in 1 to 2 weeks, and most of the rest in 1 to 2 months. Most people can remain active.  People experience and describe pain differently; not everyone is the same.  · The pain can be sharp, stabbing, shooting, aching, cramping or burning.  · Movement, standing, bending, lifting, sitting, or walking may worsen pain.  · It can be localized to one spot or area, or it can be more generalized.  · It can spread or radiate upwards, to the front, or go down your arms or legs (sciatica).  · It can cause muscle spasm.  Most of the time, mechanical problems with the muscles or spine cause the pain. Mechanical problems are usually caused by an injury to the muscles or ligaments. While illness can cause back pain, it is usually not caused by a serious illness. Mechanical problems include:   · Physical activity such as sports, exercise, work, or normal activity  · Overexertion, lifting, pushing,  pulling incorrectly or too aggressively  · Sudden twisting, bending, or stretching from an accident, or accidental movement  · Poor posture  · Stretching or moving wrong, without noticing pain at the time  · Poor coordination, lack of regular exercise (check with your doctor about this)  · Spinal disc disease or arthritis  · Stress  Pain can also be related to pregnancy, or illness like appendicitis, bladder or kidney infections, pelvic infections, and many other things.  Acute back pain usually gets better in 1 to 2 weeks. Back pain related to disk disease, arthritis in the spinal joints or spinal stenosis (narrowing of the spinal canal) can become chronic and last for months or years.  Unless you had a physical injury (for example, a car accident or fall) X-rays are usually not needed for the initial evaluation of back pain. If pain continues and does not respond to medical treatment, X-rays and other tests may be needed.  Home care  Try these home care recommendations:  · When in bed, try to find a position of comfort. A firm mattress is best. Try lying flat on your back with pillows under your knees. You can also try lying on your side with your knees bent up towards your chest and a pillow between your knees.  · At first, do not try to stretch out the sore spots. If there is a strain, it is not like the good soreness you get after exercising without an injury. In this case, stretching may make it worse.  · Avoid prolong sitting, long car rides, or travel. This puts more stress on the lower back than standing or walking.  · During the first 24 to 72 hours after an acute injury or flare up of chronic back pain, apply an ice pack to the painful area for 20 minutes and then remove it for 20 minutes. Do this over a period of 60 to 90 minutes or several times a day. This will reduce swelling and pain. Wrap the ice pack in a thin towel or plastic to protect your skin.  · You can start with ice, then switch to heat.  Heat (hot shower, hot bath, or heating pad) reduces pain and works well for muscle spasms. Heat can be applied to the painful area for 20 minutes then remove it for 20 minutes. Do this over a period of 60 to 90 minutes or several times a day. Do not sleep on a heating pad. It can lead to skin burns or tissue damage.  · You can alternate ice and heat therapy. Talk with your doctor about the best treatment for your back pain.  · Therapeutic massage can help relax the back muscles without stretching them.  · Be aware of safe lifting methods and do not lift anything without stretching first.  Medicines  Talk to your doctor before using medicine, especially if you have other medical problems or are taking other medicines.  · You may use over-the-counter medicine as directed on the bottle to control pain, unless another pain medicine was prescribed. If you have chronic conditions like diabetes, liver or kidney disease, stomach ulcers, or gastrointestinal bleeding, or are taking blood thinners, talk to your doctor before taking any medicine.  · Be careful if you are given a prescription medicines, narcotics, or medicine for muscle spasms. They can cause drowsiness, affect your coordination, reflexes, and judgement. Do not drive or operate heavy machinery.  Follow-up care  Follow up with your healthcare provider, or as advised.   A radiologist will review any X-rays that were taken. Your provide will notify you of any new findings that may affect your care.  Call 911  Call emergency services if any of the following occur:  · Trouble breathing  · Confusion  · Very drowsy or trouble awakening  · Fainting or loss of consciousness  · Rapid or very slow heart rate  · Loss of bowel or bladder control  When to seek medical advice  Call your healthcare provider right away if any of these occur:   · Pain becomes worse or spreads to your legs  · Weakness or numbness in one or both legs  · Numbness in the groin or genital area  Date  Last Reviewed: 7/1/2016  © 6583-5294 [employer]. [address] [place] [address], [place] [address]. All rights reserved. This information is not intended as a substitute for professional medical care. Always follow your healthcare professional's instructions.

## 2020-12-22 NOTE — PROGRESS NOTES
Subjective:       Patient ID: Noe Macdonald is a 45 y.o. male.    Vitals:  temperature is 98.6 °F (37 °C). His blood pressure is 152/93 (abnormal) and his pulse is 63. His oxygen saturation is 97%.     Chief Complaint: Back Pain    Patient presenting with mid-back pain x 6 days. Reports that the pain is constant but will occasionally increase in severity (sharp pain 7/10). States that it hurts mostly when he takes a deep breath (left-sided upper back) and with movement. Denies any tenderness to palpation. He's been taking Tylenol without relief. Denies any known injury or radiation of pain. Patient believes that he slept wrong. Reports that he does have a history of allergies and sneezing/coughing has been making the pain even worse. Denies fever, chest pain, palpitations, SOB, recent sick contacts, history of blood clots. 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    Back Pain  This is a new problem. The current episode started in the past 7 days. The problem occurs constantly. The problem is unchanged. The pain is present in the thoracic spine. The quality of the pain is described as aching. The pain does not radiate. The pain is at a severity of 7/10. The pain is mild. The symptoms are aggravated by position. Pertinent negatives include no abdominal pain, bladder incontinence, bowel incontinence, chest pain, dysuria, fever or numbness. He has tried NSAIDs for the symptoms. The treatment provided mild relief.       Constitution: Negative for chills, sweating, fatigue and fever.   HENT: Positive for congestion.    Cardiovascular: Negative for chest pain and palpitations.   Respiratory: Positive for cough. Negative for shortness of breath.    Gastrointestinal: Negative for abdominal pain and bowel incontinence.   Genitourinary: Negative for dysuria, urgency, bladder incontinence and hematuria.   Musculoskeletal: Positive for  abnormal ROM of joint, back pain and muscle ache. Negative for trauma, joint pain, joint swelling, muscle cramps and history of spine disorder.   Skin: Negative for rash.   Allergic/Immunologic: Positive for seasonal allergies and sneezing.   Neurological: Negative for coordination disturbances, numbness and tingling.       Objective:      Physical Exam   Constitutional: He is oriented to person, place, and time. He appears well-developed. He is cooperative. No distress.   HENT:   Head: Normocephalic and atraumatic.   Nose: Nose normal.   Mouth/Throat: Oropharynx is clear and moist and mucous membranes are normal.   Eyes: Conjunctivae and lids are normal.   Neck: Trachea normal, normal range of motion, full passive range of motion without pain and phonation normal. Neck supple.   Cardiovascular: Normal rate, regular rhythm, normal heart sounds and normal pulses.   Pulmonary/Chest: Effort normal and breath sounds normal.   Abdominal: Soft. Normal appearance and bowel sounds are normal. He exhibits no abdominal bruit, no pulsatile midline mass and no mass.   Musculoskeletal:         General: No deformity.      Cervical back: He exhibits normal range of motion, no tenderness and no bony tenderness.      Thoracic back: He exhibits decreased range of motion. He exhibits no tenderness, no bony tenderness and no swelling.        Back:    Neurological: He is alert and oriented to person, place, and time. He has normal strength and normal reflexes. No sensory deficit.   Skin: Skin is warm, dry, intact and not diaphoretic. Psychiatric: His speech is normal and behavior is normal. Judgment and thought content normal.   Nursing note and vitals reviewed.      Xr Chest Pa And Lateral    Result Date: 12/22/2020  EXAMINATION: XR CHEST PA AND LATERAL CLINICAL HISTORY: Pleurisy TECHNIQUE: PA and lateral views of the chest were performed. COMPARISON: None FINDINGS: Mediastinal structures are midline. Cardiac silhouette and pulmonary  vascular distribution are normal. Lung volumes are normal and symmetric. I detect no pulmonary disease, pleural fluid, lymph node enlargement, cardiac decompensation, pneumothorax, pneumomediastinum, pneumoperitoneum or significant osseous abnormality.     No pleural fluid, pneumothorax, osseous abnormality or other source for pleural pain identified. Electronically signed by: Deisy Foster MD Date:    12/22/2020 Time:    09:56    Assessment:       1. Upper back pain on left side    2. Pleurisy        Plan:         Upper back pain on left side  -     ibuprofen (ADVIL,MOTRIN) 800 MG tablet; Take 1 tablet (800 mg total) by mouth 3 (three) times daily. for 10 days  Dispense: 30 tablet; Refill: 0  -     methocarbamoL (ROBAXIN) 500 MG Tab; Take 1 tablet (500 mg total) by mouth 4 (four) times daily. for 10 days  Dispense: 40 tablet; Refill: 0    Pleurisy  -     XR CHEST PA AND LATERAL; Future; Expected date: 12/22/2020  -     ibuprofen (ADVIL,MOTRIN) 800 MG tablet; Take 1 tablet (800 mg total) by mouth 3 (three) times daily. for 10 days  Dispense: 30 tablet; Refill: 0         Patient Instructions   General Discharge Instructions   If you were prescribed a narcotic or controlled medication, do not drive or operate heavy equipment or machinery while taking these medications.  If you were prescribed antibiotics, please take them to completion.  You must understand that you've received an Urgent Care treatment only and that you may be released before all your medical problems are known or treated. You, the patient, will arrange for follow up care as instructed.  Follow up with your PCP or specialty clinic as directed in the next 1-2 weeks if not improved or as needed.  You can call [phone] to schedule an appointment with the appropriate provider.  If your condition worsens we recommend that you receive another evaluation at the emergency room immediately or contact your primary medical clinics after hours call  service to discuss your concerns.  Please return here or go to the Emergency Department for any concerns or worsening of condition.      Pleurisy    If you have pleurisy, the lining around your lungs is inflamed. This is most often due to a viral infection or pneumonia. It usually lasts for 10 to 14 days. It may cause sharp pain with breathing, coughing, sneezing, and movement. Antibiotics are usually not prescribed for this condition unless pneumonia is also present.  The following tips will help you care for your condition at home:  · If symptoms are severe, rest at home for the first 2 to 3 days. When you resume activity, don't let yourself get too tired.  · Do not smoke. Also avoid being exposed to secondhand smoke.  · You may use over-the-counter medicines to control pain, unless another pain medicine was prescribed. (Note: If you have chronic liver or kidney disease or have ever had a stomach ulcer or gastrointestinal bleeding, talk with your healthcare provider before using these medicines. Also talk to your provider if you are taking medicine to prevent blood clots.) Aspirin should never be given to anyone younger than 18 years of age who is ill with a viral infection or fever. It may cause severe liver or brain damage.  Follow-up care  Follow up with your healthcare provider, or as advised.  When to seek medical advice  Call your healthcare provider right away if any of these occur:  · Fever over 100.4ºF (38ºC)  · Coughing up lots of colored sputum (mucus)  · Redness, pain, or swelling of the leg  Call 911, or get immediate medical care  Contact emergency services right away if any of these occur:  · Increasing shortness of breath  · Increasing chest pain, or pain that spreads to the neck, arm, or back  · Coughing up blood  Date Last Reviewed: 9/13/2015 © 2000-2017 [employer]. [address] [place] [address], [place] [address]. All rights reserved. This information is not intended as a substitute for  professional medical care. Always follow your healthcare professional's instructions.      Back Pain (Acute or Chronic)    Back pain is one of the most common problems. The good news is that most people feel better in 1 to 2 weeks, and most of the rest in 1 to 2 months. Most people can remain active.  People experience and describe pain differently; not everyone is the same.  · The pain can be sharp, stabbing, shooting, aching, cramping or burning.  · Movement, standing, bending, lifting, sitting, or walking may worsen pain.  · It can be localized to one spot or area, or it can be more generalized.  · It can spread or radiate upwards, to the front, or go down your arms or legs (sciatica).  · It can cause muscle spasm.  Most of the time, mechanical problems with the muscles or spine cause the pain. Mechanical problems are usually caused by an injury to the muscles or ligaments. While illness can cause back pain, it is usually not caused by a serious illness. Mechanical problems include:   · Physical activity such as sports, exercise, work, or normal activity  · Overexertion, lifting, pushing, pulling incorrectly or too aggressively  · Sudden twisting, bending, or stretching from an accident, or accidental movement  · Poor posture  · Stretching or moving wrong, without noticing pain at the time  · Poor coordination, lack of regular exercise (check with your doctor about this)  · Spinal disc disease or arthritis  · Stress  Pain can also be related to pregnancy, or illness like appendicitis, bladder or kidney infections, pelvic infections, and many other things.  Acute back pain usually gets better in 1 to 2 weeks. Back pain related to disk disease, arthritis in the spinal joints or spinal stenosis (narrowing of the spinal canal) can become chronic and last for months or years.  Unless you had a physical injury (for example, a car accident or fall) X-rays are usually not needed for the initial evaluation of back pain.  If pain continues and does not respond to medical treatment, X-rays and other tests may be needed.  Home care  Try these home care recommendations:  · When in bed, try to find a position of comfort. A firm mattress is best. Try lying flat on your back with pillows under your knees. You can also try lying on your side with your knees bent up towards your chest and a pillow between your knees.  · At first, do not try to stretch out the sore spots. If there is a strain, it is not like the good soreness you get after exercising without an injury. In this case, stretching may make it worse.  · Avoid prolong sitting, long car rides, or travel. This puts more stress on the lower back than standing or walking.  · During the first 24 to 72 hours after an acute injury or flare up of chronic back pain, apply an ice pack to the painful area for 20 minutes and then remove it for 20 minutes. Do this over a period of 60 to 90 minutes or several times a day. This will reduce swelling and pain. Wrap the ice pack in a thin towel or plastic to protect your skin.  · You can start with ice, then switch to heat. Heat (hot shower, hot bath, or heating pad) reduces pain and works well for muscle spasms. Heat can be applied to the painful area for 20 minutes then remove it for 20 minutes. Do this over a period of 60 to 90 minutes or several times a day. Do not sleep on a heating pad. It can lead to skin burns or tissue damage.  · You can alternate ice and heat therapy. Talk with your doctor about the best treatment for your back pain.  · Therapeutic massage can help relax the back muscles without stretching them.  · Be aware of safe lifting methods and do not lift anything without stretching first.  Medicines  Talk to your doctor before using medicine, especially if you have other medical problems or are taking other medicines.  · You may use over-the-counter medicine as directed on the bottle to control pain, unless another pain medicine  was prescribed. If you have chronic conditions like diabetes, liver or kidney disease, stomach ulcers, or gastrointestinal bleeding, or are taking blood thinners, talk to your doctor before taking any medicine.  · Be careful if you are given a prescription medicines, narcotics, or medicine for muscle spasms. They can cause drowsiness, affect your coordination, reflexes, and judgement. Do not drive or operate heavy machinery.  Follow-up care  Follow up with your healthcare provider, or as advised.   A radiologist will review any X-rays that were taken. Your provide will notify you of any new findings that may affect your care.  Call 911  Call emergency services if any of the following occur:  · Trouble breathing  · Confusion  · Very drowsy or trouble awakening  · Fainting or loss of consciousness  · Rapid or very slow heart rate  · Loss of bowel or bladder control  When to seek medical advice  Call your healthcare provider right away if any of these occur:   · Pain becomes worse or spreads to your legs  · Weakness or numbness in one or both legs  · Numbness in the groin or genital area  Date Last Reviewed: 7/1/2016 © 2000-2017 [employer]. [address] [place] [address]. All rights reserved. This information is not intended as a substitute for professional medical care. Always follow your healthcare professional's instructions.

## 2021-02-20 ENCOUNTER — IMMUNIZATION (OUTPATIENT)
Dept: PRIMARY CARE CLINIC | Facility: CLINIC | Age: 46
End: 2021-02-20
Payer: COMMERCIAL

## 2021-02-20 DIAGNOSIS — Z23 NEED FOR VACCINATION: Primary | ICD-10-CM

## 2021-02-20 PROCEDURE — 91300 PR SARS-COV- 2 COVID-19 VACCINE, NO PRSV, 30MCG/0.3ML, IM: CPT | Mod: PBBFAC | Performed by: INTERNAL MEDICINE

## 2021-02-20 PROCEDURE — 0001A PR IMMUNIZ ADMIN, SARS-COV-2 COVID-19 VACC, 30MCG/0.3ML, 1ST DOSE: CPT | Mod: PBBFAC | Performed by: INTERNAL MEDICINE

## 2021-02-20 RX ADMIN — RNA INGREDIENT BNT-162B2 0.3 ML: 0.23 INJECTION, SUSPENSION INTRAMUSCULAR at 03:02

## 2021-03-13 ENCOUNTER — IMMUNIZATION (OUTPATIENT)
Dept: PRIMARY CARE CLINIC | Facility: CLINIC | Age: 46
End: 2021-03-13
Payer: COMMERCIAL

## 2021-03-13 DIAGNOSIS — Z23 NEED FOR VACCINATION: Primary | ICD-10-CM

## 2021-03-13 PROCEDURE — 91300 PR SARS-COV- 2 COVID-19 VACCINE, NO PRSV, 30MCG/0.3ML, IM: ICD-10-PCS | Mod: S$GLB,,, | Performed by: INTERNAL MEDICINE

## 2021-03-13 PROCEDURE — 0002A PR IMMUNIZ ADMIN, SARS-COV-2 COVID-19 VACC, 30MCG/0.3ML, 2ND DOSE: CPT | Mod: CV19,S$GLB,, | Performed by: INTERNAL MEDICINE

## 2021-03-13 PROCEDURE — 91300 PR SARS-COV- 2 COVID-19 VACCINE, NO PRSV, 30MCG/0.3ML, IM: CPT | Mod: S$GLB,,, | Performed by: INTERNAL MEDICINE

## 2021-03-13 PROCEDURE — 0002A PR IMMUNIZ ADMIN, SARS-COV-2 COVID-19 VACC, 30MCG/0.3ML, 2ND DOSE: ICD-10-PCS | Mod: CV19,S$GLB,, | Performed by: INTERNAL MEDICINE

## 2021-03-13 RX ADMIN — Medication 0.3 ML: at 03:03
